# Patient Record
Sex: FEMALE | Race: BLACK OR AFRICAN AMERICAN | NOT HISPANIC OR LATINO | Employment: FULL TIME | URBAN - METROPOLITAN AREA
[De-identification: names, ages, dates, MRNs, and addresses within clinical notes are randomized per-mention and may not be internally consistent; named-entity substitution may affect disease eponyms.]

---
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
## 2020-11-04 PROBLEM — E01.0 THYROMEGALY: Status: RESOLVED | Noted: 2018-07-31 | Resolved: 2020-11-04

## 2020-11-04 PROBLEM — L73.2 HIDRADENITIS: Status: ACTIVE | Noted: 2020-11-04

## 2020-11-04 PROBLEM — N62 MACROMASTIA: Status: ACTIVE | Noted: 2020-11-04

## 2021-07-01 ENCOUNTER — PATIENT MESSAGE (OUTPATIENT)
Dept: ADMINISTRATIVE | Facility: OTHER | Age: 21
End: 2021-07-01

## 2021-09-07 PROBLEM — L02.422 FURUNCLE OF LEFT AXILLA: Status: ACTIVE | Noted: 2021-09-07

## 2022-01-18 ENCOUNTER — HOSPITAL ENCOUNTER (OUTPATIENT)
Dept: RADIOLOGY | Facility: HOSPITAL | Age: 22
Discharge: HOME | End: 2022-01-18
Attending: PHYSICIAN ASSISTANT
Payer: COMMERCIAL

## 2022-01-18 ENCOUNTER — OFFICE VISIT (OUTPATIENT)
Dept: GASTROENTEROLOGY | Facility: CLINIC | Age: 22
End: 2022-01-18
Payer: COMMERCIAL

## 2022-01-18 VITALS
HEIGHT: 67 IN | BODY MASS INDEX: 32.2 KG/M2 | WEIGHT: 205.13 LBS | OXYGEN SATURATION: 98 % | SYSTOLIC BLOOD PRESSURE: 104 MMHG | DIASTOLIC BLOOD PRESSURE: 68 MMHG | HEART RATE: 72 BPM

## 2022-01-18 DIAGNOSIS — R19.8 IRREGULAR BOWEL HABITS: ICD-10-CM

## 2022-01-18 DIAGNOSIS — R11.2 NON-INTRACTABLE VOMITING WITH NAUSEA, UNSPECIFIED VOMITING TYPE: ICD-10-CM

## 2022-01-18 DIAGNOSIS — R10.84 GENERALIZED ABDOMINAL PAIN: ICD-10-CM

## 2022-01-18 DIAGNOSIS — K62.5 RECTAL BLEEDING: ICD-10-CM

## 2022-01-18 PROCEDURE — 99999 PR PBB SHADOW E&M-EST. PATIENT-LVL IV: CPT | Mod: PBBFAC,,, | Performed by: PHYSICIAN ASSISTANT

## 2022-01-18 PROCEDURE — 74019 XR ABDOMEN FLAT AND ERECT: ICD-10-PCS | Mod: 26,,, | Performed by: RADIOLOGY

## 2022-01-18 PROCEDURE — 3074F SYST BP LT 130 MM HG: CPT | Mod: CPTII,S$GLB,, | Performed by: PHYSICIAN ASSISTANT

## 2022-01-18 PROCEDURE — 74019 RADEX ABDOMEN 2 VIEWS: CPT | Mod: TC

## 2022-01-18 PROCEDURE — 3078F DIAST BP <80 MM HG: CPT | Mod: CPTII,S$GLB,, | Performed by: PHYSICIAN ASSISTANT

## 2022-01-18 PROCEDURE — 3078F PR MOST RECENT DIASTOLIC BLOOD PRESSURE < 80 MM HG: ICD-10-PCS | Mod: CPTII,S$GLB,, | Performed by: PHYSICIAN ASSISTANT

## 2022-01-18 PROCEDURE — 99999 PR PBB SHADOW E&M-EST. PATIENT-LVL IV: ICD-10-PCS | Mod: PBBFAC,,, | Performed by: PHYSICIAN ASSISTANT

## 2022-01-18 PROCEDURE — 74019 RADEX ABDOMEN 2 VIEWS: CPT | Mod: 26,,, | Performed by: RADIOLOGY

## 2022-01-18 PROCEDURE — 3008F BODY MASS INDEX DOCD: CPT | Mod: CPTII,S$GLB,, | Performed by: PHYSICIAN ASSISTANT

## 2022-01-18 PROCEDURE — 99204 OFFICE O/P NEW MOD 45 MIN: CPT | Mod: S$GLB,,, | Performed by: PHYSICIAN ASSISTANT

## 2022-01-18 PROCEDURE — 1159F MED LIST DOCD IN RCRD: CPT | Mod: CPTII,S$GLB,, | Performed by: PHYSICIAN ASSISTANT

## 2022-01-18 PROCEDURE — 1159F PR MEDICATION LIST DOCUMENTED IN MEDICAL RECORD: ICD-10-PCS | Mod: CPTII,S$GLB,, | Performed by: PHYSICIAN ASSISTANT

## 2022-01-18 PROCEDURE — 99204 PR OFFICE/OUTPT VISIT, NEW, LEVL IV, 45-59 MIN: ICD-10-PCS | Mod: S$GLB,,, | Performed by: PHYSICIAN ASSISTANT

## 2022-01-18 PROCEDURE — 3008F PR BODY MASS INDEX (BMI) DOCUMENTED: ICD-10-PCS | Mod: CPTII,S$GLB,, | Performed by: PHYSICIAN ASSISTANT

## 2022-01-18 PROCEDURE — 3074F PR MOST RECENT SYSTOLIC BLOOD PRESSURE < 130 MM HG: ICD-10-PCS | Mod: CPTII,S$GLB,, | Performed by: PHYSICIAN ASSISTANT

## 2022-01-18 NOTE — PROGRESS NOTES
GI OUTPATIENT NOTE    PCP: Heavenly Diop [id number] [facility] / [facility] [address]    Chief Complaint   Patient presents with    Vomiting     With nausea/HA and diarrhea    Abdominal Pain       HISTORY OF PRESENT ILLNESS:  21 y.o. female presents to the GI clinic today for initial evaluation. The patient complains of abdominal pain and vomiting. Symptoms started at least two months ago. The pain wakes her about 5-6 am. It is sharp and generalized. Sometimes it is followed by a loose stool and then leads to vomiting. Sometimes she has cramping like she needs to have a BM, but can't go. She will then try to induce vomiting to get relief. This usually provides relief of the pain. Over the last week or two, the vomiting has improved, but she still feels bad in the morning. She often has a headache about an hour after waking. She denies hematemesis, dysphagia, change in appetite, or weight loss.     She has a history of constipation about two years ago, but had been ok up until these symptoms started. Her baseline is a BM 1-2 times a day. Stools are usually formed without straining or pain. She had one episode of rectal bleeding which prompted a PCP visit about two weeks ago. She was prescribed Protonix and Zofran. Neither provided relief. She hasn't had any bleeding since. She has never had an EGD or Colonoscopy. She denies a family history of IBD or colon cancer. Her maternal great grandfather had pancreatic cancer. She denies a change in diet. She change in stress; she works from home.     Past Medical History:   Diagnosis Date    Anemia     Insulin resistance     Well child check 07/11/2016       No past surgical history on file.    Social History     Socioeconomic History    Marital status: Single   Tobacco Use    Smoking status: Never Smoker    Smokeless tobacco: Never Used   Substance and Sexual Activity    Alcohol use: No    Drug use: No    Sexual activity: Never       Family History   Problem  Relation Age of Onset    Hypertension Maternal Grandmother     Cancer Maternal Grandfather     Hypertension Maternal Grandfather     Hypertension Paternal Grandmother     Diabetes Paternal Grandfather     Hypertension Paternal Grandfather     Heart disease Neg Hx        MEDS/ALLERGY:  The patient's medications and allergies were reviewed and updated in the EPIC chart.     Review of Systems   Constitutional: Negative for fever.   HENT: Negative for hearing loss.    Eyes: Negative for visual disturbance.   Respiratory: Negative for cough and shortness of breath.    Cardiovascular: Negative for chest pain.   Gastrointestinal:        As per HPI.   Genitourinary: Negative for dysuria, frequency and hematuria.   Musculoskeletal: Positive for back pain. Negative for arthralgias.   Skin: Negative for rash.   Neurological: Positive for headaches. Negative for seizures, syncope and numbness.   Hematological: Does not bruise/bleed easily.   Psychiatric/Behavioral: The patient is not nervous/anxious.        Physical Exam  Constitutional:       Appearance: Normal appearance. She is well-developed.   HENT:      Head: Normocephalic and atraumatic.   Eyes:      Extraocular Movements: Extraocular movements intact.   Neck:      Thyroid: No thyromegaly.      Vascular: No carotid bruit.   Cardiovascular:      Rate and Rhythm: Normal rate and regular rhythm.      Heart sounds: No murmur heard.      Pulmonary:      Effort: Pulmonary effort is normal. No respiratory distress.      Breath sounds: Normal breath sounds. No wheezing.   Abdominal:      General: Bowel sounds are normal. There is no distension.      Palpations: Abdomen is soft. There is no mass.      Tenderness: There is no abdominal tenderness.   Musculoskeletal:      Cervical back: Normal range of motion and neck supple.   Skin:     General: Skin is warm and dry.      Findings: No rash.   Neurological:      Mental Status: She is alert and oriented to person, place, and  time.      Cranial Nerves: No cranial nerve deficit.      Gait: Gait normal.   Psychiatric:         Thought Content: Thought content normal.         LABS/IMAGING:   Pertinent results were reviewed.     ASSESSMENT:  1. Generalized abdominal pain    2. Irregular bowel habits    3. Non-intractable vomiting with nausea, unspecified vomiting type    4. Rectal bleeding        PLAN:  Depending on results, will either try symptomatic treatment with fiber and Bentyl vs colonoscopy.     ORDER SUMMARY:  Orders Placed This Encounter   Procedures    X-Ray Abdomen Flat And Erect    C-Reactive Protein    Sedimentation rate    CBC Auto Differential    Hepatic Function Panel        Follow up in about 4 weeks (around 2/15/2022).     Thank you for the opportunity to participate in the care of this patient. This consult was designated to me by my supervising physician.   Geoffrey Puentes PA-C.

## 2022-01-19 RX ORDER — DICYCLOMINE HYDROCHLORIDE 20 MG/1
20 TABLET ORAL NIGHTLY
Qty: 60 TABLET | Refills: 0 | Status: SHIPPED | OUTPATIENT
Start: 2022-01-19 | End: 2022-02-18

## 2022-05-03 PROBLEM — K58.2 IRRITABLE BOWEL SYNDROME WITH BOTH CONSTIPATION AND DIARRHEA: Status: ACTIVE | Noted: 2022-05-03

## 2022-05-03 PROBLEM — R53.83 OTHER FATIGUE: Status: ACTIVE | Noted: 2022-05-03

## 2022-05-03 PROBLEM — J45.20 MILD INTERMITTENT ASTHMA WITHOUT COMPLICATION: Status: ACTIVE | Noted: 2022-05-03

## 2022-05-03 PROBLEM — G47.33 OSA (OBSTRUCTIVE SLEEP APNEA): Status: ACTIVE | Noted: 2022-05-03
